# Patient Record
Sex: FEMALE | Race: WHITE | NOT HISPANIC OR LATINO | Employment: PART TIME | URBAN - NONMETROPOLITAN AREA
[De-identification: names, ages, dates, MRNs, and addresses within clinical notes are randomized per-mention and may not be internally consistent; named-entity substitution may affect disease eponyms.]

---

## 2024-09-11 ENCOUNTER — OFFICE VISIT (OUTPATIENT)
Dept: OBGYN | Facility: OTHER | Age: 34
End: 2024-09-11
Attending: NURSE PRACTITIONER
Payer: COMMERCIAL

## 2024-09-11 VITALS
TEMPERATURE: 97.6 F | BODY MASS INDEX: 24.28 KG/M2 | HEIGHT: 64 IN | HEART RATE: 68 BPM | RESPIRATION RATE: 18 BRPM | SYSTOLIC BLOOD PRESSURE: 112 MMHG | WEIGHT: 142.2 LBS | DIASTOLIC BLOOD PRESSURE: 70 MMHG | OXYGEN SATURATION: 99 %

## 2024-09-11 DIAGNOSIS — N92.1 MENORRHAGIA WITH IRREGULAR CYCLE: Primary | ICD-10-CM

## 2024-09-11 DIAGNOSIS — Z11.3 ROUTINE SCREENING FOR STI (SEXUALLY TRANSMITTED INFECTION): ICD-10-CM

## 2024-09-11 DIAGNOSIS — Z12.4 CERVICAL CANCER SCREENING: ICD-10-CM

## 2024-09-11 LAB
BACTERIAL VAGINOSIS VAG-IMP: NEGATIVE
BASOPHILS # BLD AUTO: 0 10E3/UL (ref 0–0.2)
BASOPHILS NFR BLD AUTO: 1 %
C TRACH DNA SPEC QL PROBE+SIG AMP: NEGATIVE
CANDIDA DNA VAG QL NAA+PROBE: NOT DETECTED
CANDIDA GLABRATA / CANDIDA KRUSEI DNA: NOT DETECTED
EOSINOPHIL # BLD AUTO: 0.1 10E3/UL (ref 0–0.7)
EOSINOPHIL NFR BLD AUTO: 2 %
ERYTHROCYTE [DISTWIDTH] IN BLOOD BY AUTOMATED COUNT: 11.9 % (ref 10–15)
HCT VFR BLD AUTO: 41.3 % (ref 35–47)
HCV AB SERPL QL IA: NONREACTIVE
HGB BLD-MCNC: 13.5 G/DL (ref 11.7–15.7)
HIV 1+2 AB+HIV1 P24 AG SERPL QL IA: NONREACTIVE
IMM GRANULOCYTES # BLD: 0 10E3/UL
IMM GRANULOCYTES NFR BLD: 0 %
LYMPHOCYTES # BLD AUTO: 1.5 10E3/UL (ref 0.8–5.3)
LYMPHOCYTES NFR BLD AUTO: 35 %
MCH RBC QN AUTO: 30.6 PG (ref 26.5–33)
MCHC RBC AUTO-ENTMCNC: 32.7 G/DL (ref 31.5–36.5)
MCV RBC AUTO: 94 FL (ref 78–100)
MONOCYTES # BLD AUTO: 0.3 10E3/UL (ref 0–1.3)
MONOCYTES NFR BLD AUTO: 6 %
N GONORRHOEA DNA SPEC QL NAA+PROBE: NEGATIVE
NEUTROPHILS # BLD AUTO: 2.4 10E3/UL (ref 1.6–8.3)
NEUTROPHILS NFR BLD AUTO: 56 %
NRBC # BLD AUTO: 0 10E3/UL
NRBC BLD AUTO-RTO: 0 /100
PLATELET # BLD AUTO: 265 10E3/UL (ref 150–450)
RBC # BLD AUTO: 4.41 10E6/UL (ref 3.8–5.2)
T PALLIDUM AB SER QL: NONREACTIVE
T VAGINALIS DNA VAG QL NAA+PROBE: NOT DETECTED
TSH SERPL DL<=0.005 MIU/L-ACNC: 1.54 UIU/ML (ref 0.3–4.2)
WBC # BLD AUTO: 4.2 10E3/UL (ref 4–11)

## 2024-09-11 PROCEDURE — 87389 HIV-1 AG W/HIV-1&-2 AB AG IA: CPT | Mod: ZL | Performed by: NURSE PRACTITIONER

## 2024-09-11 PROCEDURE — 0352U MULTIPLEX VAGINAL PANEL BY PCR: CPT | Mod: ZL | Performed by: NURSE PRACTITIONER

## 2024-09-11 PROCEDURE — 87491 CHLMYD TRACH DNA AMP PROBE: CPT | Mod: ZL | Performed by: NURSE PRACTITIONER

## 2024-09-11 PROCEDURE — 86803 HEPATITIS C AB TEST: CPT | Mod: ZL | Performed by: NURSE PRACTITIONER

## 2024-09-11 PROCEDURE — 36415 COLL VENOUS BLD VENIPUNCTURE: CPT | Mod: ZL | Performed by: NURSE PRACTITIONER

## 2024-09-11 PROCEDURE — 87624 HPV HI-RISK TYP POOLED RSLT: CPT | Mod: ZL | Performed by: NURSE PRACTITIONER

## 2024-09-11 PROCEDURE — 84443 ASSAY THYROID STIM HORMONE: CPT | Mod: ZL | Performed by: NURSE PRACTITIONER

## 2024-09-11 PROCEDURE — 86780 TREPONEMA PALLIDUM: CPT | Mod: ZL | Performed by: NURSE PRACTITIONER

## 2024-09-11 PROCEDURE — G0463 HOSPITAL OUTPT CLINIC VISIT: HCPCS

## 2024-09-11 PROCEDURE — 85041 AUTOMATED RBC COUNT: CPT | Mod: ZL | Performed by: NURSE PRACTITIONER

## 2024-09-11 PROCEDURE — 99204 OFFICE O/P NEW MOD 45 MIN: CPT | Performed by: NURSE PRACTITIONER

## 2024-09-11 PROCEDURE — G0123 SCREEN CERV/VAG THIN LAYER: HCPCS | Performed by: NURSE PRACTITIONER

## 2024-09-11 ASSESSMENT — PAIN SCALES - GENERAL: PAINLEVEL: NO PAIN (0)

## 2024-09-11 NOTE — NURSING NOTE
Patient is here for concerns with periods lasting 15-20 days.     Patient's last menstrual period was 09/01/2024 (approximate).  Medication Reconciliation: complete    Maritza Be LPN 9/11/2024 10:00 AM       Advance care directive on file? no  Advance care directive provided to patient? no       Maritza Be LPN     None known

## 2024-09-11 NOTE — PROGRESS NOTES
Chief Complaint: Irregular menstrual cycle    HPI:  Ying is a 34 year old  female who presents for for irregular menstrual cycle since 2024.  Over the past 6 months patient has had her monthly menstrual cycle lasting 15 to 20 days.  She describes a heavy flow that usual where she feels like it is almost complete and then resumes heavier bleeding again.  Her last menstrual bleed lasted 20 days.  She does have some lower abdominal cramping and pain with this.  She is changing her menses cup about 5-6 times a day during her heavier flow.  Since age of menarche around 13 years old she states she has had regular monthly menstrual cycles that would typically last 5 to 7 days.  She describes this as having heavier bleeding with some cramping but tolerable.    Patient is not currently sexually active.  No concern for abnormal vaginal or urinary symptoms.  No known exposure to sexually transmitted infection.  She is not currently on contraception.  Historically she took OCPs and 2 years ago she removed her IUD which was in place for 2 years.  She states she tolerated this well.  She did have some bleeding with intercourse otherwise typically did not have a monthly menses.    Patient has had 3 children born vaginally ages 5, 8 and 10.  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  She is going through a separation from her  that started this May 2024.  She moved from [facility] to [facility] and is currently living with her parents during her separation.    Patient states she did have a mammogram last year due to breast tenderness.  Mammogram and ultrasound returned normal other than showing fibril dense breast tissues.    Patient's last cervical cancer screening was 2020, ASCUS.  Denies history of colposcopy.  Patient's last menstrual period was 2024 (approximate).    OB History  OB History    Para Term  AB Living   3 3 3 0 0 3  "  SAB IAB Ectopic Multiple Live Births   0 0 0 0 0      # Outcome Date GA Lbr Ortiz/2nd Weight Sex Type Anes PTL Lv   3 Term 2020    M Vag-Spont      2 Term 2017    F       1 Term 2014    M Vag-Spont        History reviewed. No pertinent past medical history.    No current outpatient medications on file.     No current facility-administered medications for this visit.     No Known Allergies  /70   Pulse 68   Temp 97.6  F (36.4  C) (Tympanic)   Resp 18   Ht 1.626 m (5' 4\")   Wt 64.5 kg (142 lb 3.2 oz)   LMP 2024 (Approximate)   SpO2 99%   Breastfeeding No   BMI 24.41 kg/m      REVIEW OF SYSTEMS  As Per HPI, Otherwise negative.     Physical Exam:  Constitutional: healthy, alert, and no distress  Neck/throat: No thyromegaly, no adenopathy  Respiratory: Lungs clear to auscultation, no wheezing or rhonchi, unlabored, no cough  Cardiac: Rate rhythm regular, no murmur, normal perfusion to all extremities.  Pelvic: Normal external female genitalia.  No visible concerning external vaginal or anal lesions.  Normal vulva, cervix and vagina. Cervix visible,  friable. No Vaginal discharge . No Vaginal odor.  Normal uterus, no adnexal mass or tenderness.  Chaperone present.     Lab:   Results for orders placed or performed in visit on 24   CBC with platelets and differential     Status: None   Result Value Ref Range    WBC Count 4.2 4.0 - 11.0 10e3/uL    RBC Count 4.41 3.80 - 5.20 10e6/uL    Hemoglobin 13.5 11.7 - 15.7 g/dL    Hematocrit 41.3 35.0 - 47.0 %    MCV 94 78 - 100 fL    MCH 30.6 26.5 - 33.0 pg    MCHC 32.7 31.5 - 36.5 g/dL    RDW 11.9 10.0 - 15.0 %    Platelet Count 265 150 - 450 10e3/uL    % Neutrophils 56 %    % Lymphocytes 35 %    % Monocytes 6 %    % Eosinophils 2 %    % Basophils 1 %    % Immature Granulocytes 0 %    NRBCs per 100 WBC 0 <1 /100    Absolute Neutrophils 2.4 1.6 - 8.3 10e3/uL    Absolute Lymphocytes 1.5 0.8 - 5.3 10e3/uL    Absolute Monocytes 0.3 0.0 - 1.3 10e3/uL    " Absolute Eosinophils 0.1 0.0 - 0.7 10e3/uL    Absolute Basophils 0.0 0.0 - 0.2 10e3/uL    Absolute Immature Granulocytes 0.0 <=0.4 10e3/uL    Absolute NRBCs 0.0 10e3/uL   CBC and Differential     Status: None    Narrative    The following orders were created for panel order CBC and Differential.  Procedure                               Abnormality         Status                     ---------                               -----------         ------                     CBC with platelets and d...[[id number]]                      Final result                 Please view results for these tests on the individual orders.       ASSESSMENT/PLAN :  1. Menorrhagia with irregular cycle  - TSH; Future  - CBC and Differential; Future  - US Pelvic Complete with Transvaginal; Future    Menorrhagia with irregular cycle for more than 6 months having monthly menstrual cycle lasting 15 to 20 days.  Patient describes heavier menstrual flow and painful cramping.  Prior to this patient had regular monthly menstrual cycle since age of menarche lasting 5 to 7 days.  Not currently on contraception.  Recommend ruling out infection, STI panel, MVP swab were collected today.  Will check TSH for thyroid dysfunction and CBC to assess for anemia.  Recommend proceeding with pelvic ultrasound to rule out any acute abnormalities causing irregular menses.  Patient shares that she has been going through increased stress as she is in the process of  from her .  She has 3 young children and has recently moved to [place].  We discussed that stress and anxiety can impact her menstrual cycle.  She overall feels like she is managing well.    Follow-up: We will wait to see results of lab work and pelvic ultrasound.  If diagnostic eval today returned normal,  we could discuss further interventions including starting contraception to help with menstrual management.  Plan for office visit in 1 month after pelvic ultrasound is  completed.    2. Cervical cancer screening  - HPV and Gynecologic Cytology Panel - Recommended Age 30 - 65 Years  Last Pap August 2020 returned showing ASCUS.  No prior history of colposcopy.  Recommend updating cervical cancer screening today.    3. Routine screening for STI (sexually transmitted infection)  - Chlamydia trachomatis/Neisseria gonorrhoeae by PCR  - Multiplex Vaginal Panel by PCR  - HIV Antigen Antibody Combo; Future  - Hepatitis C Screen Reflex to HCV RNA Quant and Genotype; Future  - Treponema Ab w Reflex to RPR and Titer; Future  - HIV Antigen Antibody Combo  - Hepatitis C Screen Reflex to HCV RNA Quant and Genotype  - Treponema Ab w Reflex to RPR and Titer     Patient is currently going through separation from .  No known exposure to sexually transmitted infection.  She is asymptomatic.  Would like to have testing to ensure she has no infection.    Explanation of diagnosis, treatment options and risk and benefits of medications reviewed with patient. Patient agrees with plan of care.  Patient was given verbal and written instructions on plan of care. Instructions were printed or are available on [employer] on electronic AVS.     DAYNE BaroneP., R.N., APRN [facility]  10:03 AM 9/11/2024

## 2024-09-13 LAB
BKR AP ASSOCIATED HPV REPORT: ABNORMAL
BKR LAB AP GYN ADEQUACY: ABNORMAL
BKR LAB AP GYN INTERPRETATION: ABNORMAL
BKR LAB AP LMP: ABNORMAL
BKR LAB AP PREVIOUS ABNL DX: ABNORMAL
BKR LAB AP PREVIOUS ABNORMAL: ABNORMAL
PATH REPORT.COMMENTS IMP SPEC: ABNORMAL
PATH REPORT.COMMENTS IMP SPEC: ABNORMAL
PATH REPORT.RELEVANT HX SPEC: ABNORMAL

## 2024-09-14 ENCOUNTER — HEALTH MAINTENANCE LETTER (OUTPATIENT)
Age: 34
End: 2024-09-14

## 2024-09-16 LAB
HPV HR 12 DNA CVX QL NAA+PROBE: NEGATIVE
HPV16 DNA CVX QL NAA+PROBE: NEGATIVE
HPV18 DNA CVX QL NAA+PROBE: NEGATIVE
HUMAN PAPILLOMA VIRUS FINAL DIAGNOSIS: NORMAL

## 2024-10-01 ENCOUNTER — OFFICE VISIT (OUTPATIENT)
Dept: OBGYN | Facility: OTHER | Age: 34
End: 2024-10-01
Attending: NURSE PRACTITIONER
Payer: COMMERCIAL

## 2024-10-01 ENCOUNTER — HOSPITAL ENCOUNTER (OUTPATIENT)
Dept: ULTRASOUND IMAGING | Facility: OTHER | Age: 34
Discharge: HOME | End: 2024-10-01
Attending: NURSE PRACTITIONER
Payer: COMMERCIAL

## 2024-10-01 VITALS
TEMPERATURE: 97.8 F | DIASTOLIC BLOOD PRESSURE: 74 MMHG | WEIGHT: 142.5 LBS | SYSTOLIC BLOOD PRESSURE: 120 MMHG | OXYGEN SATURATION: 98 % | BODY MASS INDEX: 24.46 KG/M2 | HEART RATE: 90 BPM | RESPIRATION RATE: 14 BRPM

## 2024-10-01 DIAGNOSIS — N93.9 ABNORMAL UTERINE BLEEDING (AUB): Primary | ICD-10-CM

## 2024-10-01 DIAGNOSIS — N92.1 MENORRHAGIA WITH IRREGULAR CYCLE: ICD-10-CM

## 2024-10-01 DIAGNOSIS — N92.6 MISSED MENSES: ICD-10-CM

## 2024-10-01 LAB — HCG INTACT+B SERPL-ACNC: <1 MIU/ML

## 2024-10-01 PROCEDURE — G0463 HOSPITAL OUTPT CLINIC VISIT: HCPCS | Mod: 25

## 2024-10-01 PROCEDURE — 84702 CHORIONIC GONADOTROPIN TEST: CPT | Mod: ZL | Performed by: NURSE PRACTITIONER

## 2024-10-01 PROCEDURE — 99214 OFFICE O/P EST MOD 30 MIN: CPT | Performed by: NURSE PRACTITIONER

## 2024-10-01 PROCEDURE — 76856 US EXAM PELVIC COMPLETE: CPT

## 2024-10-01 PROCEDURE — 36415 COLL VENOUS BLD VENIPUNCTURE: CPT | Mod: ZL | Performed by: NURSE PRACTITIONER

## 2024-10-01 PROCEDURE — 93976 VASCULAR STUDY: CPT

## 2024-10-01 ASSESSMENT — PAIN SCALES - GENERAL: PAINLEVEL: NO PAIN (0)

## 2024-10-01 NOTE — PROGRESS NOTES
Consult    Chief Complaint: Follow-up for irregular menstrual cycle and pelvic ultrasound    HPI:    Ying Strickland is a 34 year old , here for follow-up after consult last month for irregular menses and follow-up for pelvic ultrasound.  Since 2014 patient has had abnormally long menstrual cycle lasting 15 to 20 days.  Menses has been a heavier flow with lower abdominal cramping and pain.  On 2024 at her last visit we completed STI screening, vaginitis panel, and lab work including TSH and CBC which returned within normal range.  She did have abnormal Pap and is scheduled for a colposcopy with Dr. Swanson  Pelvic ultrasound completed today and she is here to discuss results.    She has not had any vaginal bleeding since her last visit, her last menses was 2024 however that period lasted 20 days.    OBHx  OB History    Para Term  AB Living   3 3 3 0 0 3   SAB IAB Ectopic Multiple Live Births   0 0 0 0 0      # Outcome Date GA Lbr Ortiz/2nd Weight Sex Type Anes PTL Lv   3 Term     M Vag-Spont      2 Term     F       1 Term     M Vag-Spont          ROS: 10-Point ROS negative except as noted in HPI    Physical Exam  /74   Pulse 90   Temp 97.8  F (36.6  C) (Tympanic)   Resp 14   Wt 64.6 kg (142 lb 8 oz)   LMP 2024 (Approximate)   SpO2 98%   Breastfeeding No   BMI 24.46 kg/m    Gen: Well-appearing, well developed  Resp: nonlabored, normal effort, no audible wheezing  GI: soft, nontender, no flank pain  MS: moving without difficulty  Psych: appropriate mood and affect    ASSESSMENT/PLAN  Ying Strickland is a 34 year old  female here for irregular menses and ultrasound follow-up.  -She has had 6 months of prolonged cycles lasting 15 to 20 days and is here for follow-up.  -Patient has been sexually active since last menses and would like to rule out pregnancy.  hCG quant level obtained today and returned negative.  -Ultrasound obtained  today revealing normal endometrium, no abnormal masses, lesions, cysts or acute abnormalities.  -3 weeks ago patient had negative STI testing, MVP testing. normal CBC-Hgb 13.5 and TSH 1.54    Recommend patient monitor for next menstrual cycle and let us know if her cycle continues to be prolonged, or if it is normalizing.  We discussed contraception for menstrual management, patient would like to avoid contraception at this time.  She will see Dr. Swanson for colposcopy in a couple of weeks.    Abigail Person NP  [facility]    OB/GYN

## 2024-10-15 ENCOUNTER — OFFICE VISIT (OUTPATIENT)
Dept: OBGYN | Facility: OTHER | Age: 34
End: 2024-10-15
Attending: OBSTETRICS & GYNECOLOGY
Payer: COMMERCIAL

## 2024-10-15 VITALS
BODY MASS INDEX: 23.65 KG/M2 | WEIGHT: 137.8 LBS | HEART RATE: 75 BPM | DIASTOLIC BLOOD PRESSURE: 70 MMHG | SYSTOLIC BLOOD PRESSURE: 110 MMHG

## 2024-10-15 DIAGNOSIS — R87.610 ASCUS OF CERVIX WITH NEGATIVE HIGH RISK HPV: Primary | ICD-10-CM

## 2024-10-15 DIAGNOSIS — Z01.812 PRE-PROCEDURE LAB EXAM: ICD-10-CM

## 2024-10-15 PROCEDURE — 57456 ENDOCERV CURETTAGE W/SCOPE: CPT | Performed by: OBSTETRICS & GYNECOLOGY

## 2024-10-15 PROCEDURE — G0463 HOSPITAL OUTPT CLINIC VISIT: HCPCS | Mod: 25

## 2024-10-15 PROCEDURE — 88305 TISSUE EXAM BY PATHOLOGIST: CPT

## 2024-10-15 ASSESSMENT — PAIN SCALES - GENERAL: PAINLEVEL: NO PAIN (0)

## 2024-10-15 NOTE — PROGRESS NOTES
"Colposcopy Procedure Note    34 year old  female presents for colposcopy due to pap smear on 24 showing ASCUS, HPV negative. Her previous pap was 2020 and also ASCUS, HPV negative.     Cervical Cancer Risk Factors:  - Smoking: never  - Immunosuppression: no  - HPV vaccination status: completed quadrivalent series    Time Out - \"Pause for the Cause\"  Just before the procedure began the following was verified:    Initials   Patient Name    Ying Strickland    Patient Date of Birth 1990    Procedure to be performed  Colposcopy   Procedure for colposcopy and biopsy has been explained to the patient. Consent:  Risks, benefits of treatment, and no treatment were discussed.  Patient's questions were elicited and answered.     Procedure:  Speculum placed in vagina and excellent visualization of cervix  achieved. Cervix swabbed with acetic acid solution. No acetowhite changes. Cervix then swabbed with Lugol's solution. No areas of decreased uptake. ECC performed and cells collected with cytobrush.     Assessment/Plan:   Ms. Ying Strickland is a 34 year old  with persistent ASCUS but HPV negative    -Specimens sent to pathology  -Will base further treatment on pathology findings.  -Post biopsy instructions given to patient    Rhonda Swanson MD  OB/GYN  10/15/2024 2:10 PM    "

## 2024-10-18 LAB
PATH REPORT.COMMENTS IMP SPEC: NORMAL
PATH REPORT.FINAL DX SPEC: NORMAL
PHOTO IMAGE: NORMAL

## 2024-11-20 ENCOUNTER — OFFICE VISIT (OUTPATIENT)
Dept: OBGYN | Facility: OTHER | Age: 34
End: 2024-11-20
Attending: NURSE PRACTITIONER
Payer: COMMERCIAL

## 2024-11-20 VITALS
SYSTOLIC BLOOD PRESSURE: 106 MMHG | BODY MASS INDEX: 24.68 KG/M2 | DIASTOLIC BLOOD PRESSURE: 62 MMHG | RESPIRATION RATE: 16 BRPM | WEIGHT: 143.8 LBS | OXYGEN SATURATION: 99 % | HEART RATE: 67 BPM

## 2024-11-20 DIAGNOSIS — Z30.430 ENCOUNTER FOR IUD INSERTION: Primary | ICD-10-CM

## 2024-11-20 LAB — HCG UR QL: NEGATIVE

## 2024-11-20 PROCEDURE — 58300 INSERT INTRAUTERINE DEVICE: CPT | Performed by: NURSE PRACTITIONER

## 2024-11-20 PROCEDURE — 81025 URINE PREGNANCY TEST: CPT | Mod: ZL | Performed by: NURSE PRACTITIONER

## 2024-11-20 PROCEDURE — 250N000011 HC RX IP 250 OP 636: Performed by: NURSE PRACTITIONER

## 2024-11-20 PROCEDURE — G0463 HOSPITAL OUTPT CLINIC VISIT: HCPCS | Mod: 25

## 2024-11-20 RX ADMIN — LEVONORGESTREL 1 EACH: 52 INTRAUTERINE DEVICE INTRAUTERINE at 11:25

## 2024-11-20 ASSESSMENT — PAIN SCALES - GENERAL: PAINLEVEL_OUTOF10: NO PAIN (0)

## 2024-11-20 NOTE — PROGRESS NOTES
IUD Insertion Procedure Visit    SUBJECTIVE: Ying Strickland is a 34 year old female, requests IUD insertion.  She would like to do Mirena IUD.  She has had Mirena IUD in the past.  She tolerated this well and did not get a period with it.    Verification of Procedure:  Just before the procedure begans through verbal and active participation of team members, I verified:     Initials   Patient Name Ying Strickland    Patient  1990    Procedure to be performed IUD insertion     Consent:  Risks, benefits of treatment, and alternative options for contraception were discussed.  Patient's questions were elicited and answered.  Written consent was obtained and scanned into medical record.     OBJECTIVE: /62 (BP Location: Right arm, Patient Position: Sitting, Cuff Size: Adult Regular)   Pulse 67   Resp 16   Wt 65.2 kg (143 lb 12.8 oz)   LMP 2024 (Approximate)   SpO2 99%   BMI 24.68 kg/m      Pelvic Exam: Normal external female genitalia.  No visible rashes or abrasions.  Cervix visible and non friable. No Vaginal discharge . No  Vaginal odor. No visible foreign body. Chaperone present    PROCEDURE NOTE  --  Mirena Insertion    Reason for Insertion:  contraception and abnormal uterine bleeding.    Pregnancy test: Negative    Counseling:  Patient counseled on efficacy, benefits, risks, potential side effects of IUD.  Insertion procedure and risk of infection, bleeding, cramping and perforation, and spontaneous expulsion reviewed.   Advised to plan removal and/or replacement of IUD in 8 years from today or when desired.  After the risks and benefits of the procedure were discussed with the patient, informed consent was obtained.    The speculum was gently introduced to visualize the cervix. The cervix was cleaned with betadine swabs and a single-tooth tenaculum was placed at the 12 o'clock position.  The uterus was sounded to 9 cm.   The Mirena IUD insertion device was set up according to the  uterine sound measurement, loaded and placed through the cervical canal without resistance and deployed into the fundus in the usual fashion. The IUD strings were trimmed to 2 to 3 cm from the external cervical os.  After removal of all instruments a small amount of continued oozing was noted from the tenaculum sites.  No further bleeding was noted after application of pressure at the sites.  She tolerated the procedure well.      Device Lot #: [id number]  Device Exp: August 2026  --------------------------------------------     EBL:  Minimal     Complications:  None      Ying UGO Strickland tolerated procedure well.    PLAN:      IUD discharge instructions and information given to patient. We reviewed expectations for bleeding and abdominal cramping/pain. She is to call with signs of heavy bleeding, severe cramping, or signs of infection with abnormal vaginal discharge. She will monitor for pain with intercourse, expulsion of IUD or inability to feel IUD strings.   She may use Tylenol, ibuprofen, ice and heat as needed for pain and cramping.  Recommend using backup birth control method for 1 week.  Patient should avoid inserting anything in the vaginal area such as tampons or have intercourse for the next week.  She was instructed to return in one month for a string check.   Removal or replacement is recommended in 8 years from 11/20/2024.   Ying Strickland  verbalized understanding of instructions.    Abigail Person NP  OB/GYN  11/20/2024 10:22 AM

## 2024-11-20 NOTE — NURSING NOTE
"Chief Complaint   Patient presents with    Procedure     IUD placement       Initial /62 (BP Location: Right arm, Patient Position: Sitting, Cuff Size: Adult Regular)   Pulse 67   Resp 16   Wt 65.2 kg (143 lb 12.8 oz)   LMP 11/03/2024 (Approximate)   SpO2 99%   BMI 24.68 kg/m   Estimated body mass index is 24.68 kg/m  as calculated from the following:    Height as of 9/11/24: 1.626 m (5' 4\").    Weight as of this encounter: 65.2 kg (143 lb 12.8 oz).  Medication Reconciliation: complete    Marialuisa Noyola LPN    Advance Care Directive reviewed    " no abrasion

## 2024-12-23 ENCOUNTER — HOSPITAL ENCOUNTER (OUTPATIENT)
Dept: ULTRASOUND IMAGING | Facility: OTHER | Age: 34
Discharge: HOME | End: 2024-12-23
Attending: NURSE PRACTITIONER | Admitting: NURSE PRACTITIONER
Payer: COMMERCIAL

## 2024-12-23 DIAGNOSIS — T83.32XA INTRAUTERINE CONTRACEPTIVE DEVICE THREADS LOST, INITIAL ENCOUNTER: ICD-10-CM

## 2024-12-23 PROCEDURE — 76856 US EXAM PELVIC COMPLETE: CPT

## 2024-12-23 PROCEDURE — 76830 TRANSVAGINAL US NON-OB: CPT
